# Patient Record
(demographics unavailable — no encounter records)

---

## 2018-03-22 NOTE — [place] [address]                    SURGICAL PATH RPT PROCEDURE   
_______________________________________________________________________________
 
Name:       МАРИЯ ESPINO               Room:                      REG CLI 
MJOSÉ MIGUEL#:  [id number]      Account #:      [id number]  
Admission:  03/13/18     Date of Birth:  12/19/62  
Discharge:                             Report #:    [id number]
                                                         Path Case #: [id number] 
_______________________________________________________________________________
 
  
PATHOLOGY REPORT 
    
COLLECTION DATE: 3/13/2018   RECEIVED DATE: 3/13/2018  
 SUBMITTING PHYS: Dr. Rafael Zuniga
OTHER PHYS:
GAEL Oconnor
   
SPECIMEN(S) RECEIVED:
A.R breast
    
* * * * * * * * * * * *
   
FINAL DIAGNOSIS:
Breast "breast biopsy":
- DUCTAL CARCINOMA IN SITU INTERMEDIATE GRADE MEASURING 0.3 CM IN
GREATEST DIMENSION WITH MICROCALCIFICATION. 
 
COMMENT:
This case is also reviewed by Dr. Beatriz Gunter.  
Dr. Rafael Zuniga nurse Ludy was notified on 03/14/2018.  Predictive
breast markers, ER and MT, will be ordered and an additional report
will follow.  
(SHA:pit; 3/14/2018) 
 
PATHOLOGIST:   Georges Stovall M.D. 
REPORT ELECTRONICALLY SIGNED BY:   Georges Stovall M.D.
DATE/TIME:   3/15/2018 09:29
    
* * * * * * * * * * * *
 
GROSS PATHOLOGY:
Received in formalin labeled "Мария Espino, right breast tissue,"
are multiple needle cores of yellow-gray fibrofatty tissue measuring
3.1 x 5.2 x 0.5 cm in aggregate dimensions.  Also received is a
plastic cassette containing multiple cores of yellow-gray fibrofatty
tissue measuring 2.3 x 1.5 x 0.5 cm in aggregate dimensions.  The
tissue in the cassette is transferred to cassette A3, and the
remaining tissue is submitted in its entirety in cassette A1 and A2. 
The cold ischemic time is 18 minutes.  The total formalin fixation
time is 12 hours and 18 minutes.
(TSD; 3/13/2018)
 
 
CLINICAL HISTORY:
Right stereotactic breast biopsy for right breast calcifications
 
INITIAL CPT CODE(S):
 
[place] [address]                    SURGICAL PATH RPT PROCEDURE   
_______________________________________________________________________________
 
Name:       МАРИЯ ESPINO               Room:                      REG CLI 
M.R.#:  [id number]      Account #:      [id number]  
Admission:  03/13/18     Date of Birth:  12/19/62  
Discharge:                             Report #:    [id number]
                                                         Path Case #: [id number] 
_______________________________________________________________________________
A; 80140, 99049(2)
Professional services performed [place] [facility] [place] [address]
 
  Technical services performed by [facility] at [address] [place] [address],
[address] [address].
 
PROCEDURE REPORT (Order Date: 3/20/2018 00:00)
    
COMMENT: 
Quantitative image analysis was performed on block A2.  Please see
next page for scanned image of results.
(AMJ 03/21/2018) 
PATHOLOGIST:  Georges Stovall M.D.
REPORT ELECTRONICALLY SIGNED BY:  Georges Stovall M.D.
DATE/TIME:  3/22/2018 15:29
 
   
[facility]
[address] [address]
PHONE:  [phone]
DIRECTOR:  Spencer W. Kerley, M.D.
* * *  END OF REPORT  * * *

## 2018-04-08 NOTE — ON[place] [address]                    RADIATION ONCOLOGY NOTE       
_______________________________________________________________________________
 
Name:       SRINIVASALEJANDRO A               Room:                      REG CLI 
M.R.#:  [id number]      Account #:      [id number]  
Admission:  18     Attend Phys:    Isaac Ruggiero MD    
Discharge:               Date of Birth:  62  
         Report #: [id number]
                                                                     [id number]  
_______________________________________________________________________________
THIS REPORT FOR:  //name//                      
 
CC: Isaac Pacheco MD
 
 
DATE OF SERVICE:  2018
 
[facility] Phone: [phone]
 
 
REFERRING PHYSICIANS:  Cj Graves DO; Nancy Vergara, nurse practitioner;
cardiologist is Dr. Yanes and Jessica Pacheco MD.
 
PRIMARY SITE AND HISTOPATHOLOGY:  The patient has findings consistent with a
stage 0 ([id number]) ductal carcinoma in situ of the right breast.  It is
intermediate grade.
 
HISTORY OF PRESENT ILLNESS:  The patient had a routine screening mammogram
performed at [facility] on 2018 that revealed a new small
cluster of indeterminate microcalcifications in the anterior right breast.  She
had a biopsy of that clustered microcalcifications and on 2018 that
revealed ductal carcinoma in situ, intermediate grade and measured about 0.3 cm
on the core biopsy.  She denied having any nipple discharge. She denied having
any palpable masses involving the right breast or the left breast. She saw her
surgeon, Dr. Cj Graves, and  her medical oncologist, Dr. Pacheco.  At this
point, she appears to be proceeding with breast conservation therapy.
 
PAST MEDICAL HISTORY AND PAST SURGICAL HISTORY:  She has hypertension.  She had
a  section in  at [facility].  She had a
 section in  at the [facility] and she also had
a right ovarian removal in  at [facility].
 
MEDICATIONS:  Include hydrochlorothiazide, lisinopril, Zyrtec and then, she has
supplements that she takes as well.
 
ALLERGIES:  TO SULFA, CODEINE, WHICH CAUSED HIVES AND RASH; BETADINE, WHICH
CAUSES RASH AND LATEX MAKES HER SKIN VERY DRY.
 
OBSTETRICS AND GYNECOLOGY:  Menarche age 13, menopause around 2016.  She is
 2, para 2, SAB 0.
 
FAMILY HISTORY:  Maternal grandmother had a breast cancer around age 55.  She is
 
 
 
[place] [address]                    RADIATION ONCOLOGY NOTE       
_______________________________________________________________________________
 
Name:       ALEJANDRO ESPINO MELL               Room:                      REG CLI 
M.RDario#:  [id number]      Account #:      [id number]  
Admission:  18     Attend Phys:    Isaac Ruggiero MD    
Discharge:               Date of Birth:  62  
         Report #: [id number]
                                                                     [id number]  
_______________________________________________________________________________
.  She has 2 daughters. Ethanol, she  drinks an alcohol
containing drink about 1 times a week.  Cigarettes: she does not smoke.
 
REVIEW OF SYSTEMS:
GENERAL:  She had some mild weight gain recently.
SKIN:  She denied having color changes except for a little bit of bruising where
she had her biopsy on the right breast.
LYMPH NODES:  She denied having enlarged or painful glands in the neck.
ENDOCRINE:  She denied any hot or cold intolerance.
HEMATOLOGY AND IMMUNOLOGY: She denied having any anemia or recent bleeding.
MUSCULOSKELETAL:  She denied having any arthritis or painful swollen joints.
HEAD AND NECK:  She denied having any headaches or migraines.
RESPIRATORY:  She denied having shortness of breath.
CARDIOVASCULAR:  She denied having palpitations.
GASTROINTESTINAL:  She denied having nausea.
NEUROLOGIC:  She denied having any focal weakness.
 
PHYSICAL EXAMINATION:  With my nurse, Sallie Guillaume present:
VITAL SIGNS:  Height 5 feet 2 inches, weight 165 pounds.  Blood pressure 146/87,
pulse 80, saturation 95% on room air, respirations 16.
LYMPH NODES:  She had no palpable cervical, supraclavicular or axillary
lymphadenopathy.
GENERAL AND PSYCHIATRIC:  She was alert, oriented, and in no acute distress.
EYES:  Pupils were equal, round, reactive
to light and accommodation.  Extraocular
movements were intact.
HEAD:  Mouth had no visible lesions.
HEART:  Had a regular rate and rhythm without murmur.
LUNGS: were clear to auscultation.
ABDOMEN:  Not tender.  Spleen was not palpable.  Liver was at the costal margin.
BREASTS:  Right breast had a small palpable seroma in the area of the biopsy
that measured about 1.5 cm x 1.5 cm at the 12 o'clock position.  There were no
suspicious palpable masses involving the right breast.  There were no suspicious
palpable masses involving the left breast.
EXTREMITIES:  Had no clubbing, cyanosis or edema.
NEUROLOGIC:  Cranial nerves II to XII were
intact.  Sensation was intact.  She had 5/5
strength in her extremities. 
 
ASSESSMENT AND PLAN:  The patient has findings consistent with an intermediate
grade ductal carcinoma in situ.  She was told that her treatment options
include breast conservation therapy versus mastectomy.  Radiation therapy can
further reduce the chance of recurrent cancer in the breast when pursuing
breast conservation therapy.  The efficacy of radiation therapy can be found in
the article entitled "Pathologic Findings From the National Surgical Adjuvant
Breast Project eight-year update of protocol B-17." That was published in .
 
 
 
[place] [address]                    RADIATION ONCOLOGY NOTE       
_______________________________________________________________________________
 
Name:       ALEJANDRO ESPINO               Room:                      REG CLI 
M.R.#:  [id number]      Account #:      [id number]  
Admission:  18     Attend Phys:    Isaac Ruggiero MD    
Discharge:               Date of Birth:  62  
         Report #: [id number]
                                                                     [id number]  
_______________________________________________________________________________
In that study, patients with ductal carcinoma in situ were randomized between
lumpectomy alone versus lumpectomy and radiation therapy.  At 8 years, the
frequency of ipsilateral breast tumor recurrence was reduced from 31% to 13%
with radiation therapy.  So the risks, benefits and logistics of radiation
therapy explained to the patient in detail and she wanted to go ahead and
proceed with radiation therapy to the right breast as part of breast
conservation therapy. She gave her witnessed, informed consent to proceed with
radiation therapy.
 
Thank you very much for this consult.
 
 
 
 
 
 
 
 
 
 
 
 
 
 
 
 
 
 
 
 
 
 
 
 
 
 
 
 
 
 
 
 
 
 
<ELECTRONICALLY SIGNED>
                                        By:  Isaac Ruggiero MD             
18     2155
D: 18 1210_______________________________________
T: 18 1432Dmary Ruggiero MD                /nt

## 2018-04-20 NOTE — S
[address]  [address]                    SURGICAL PATH RPT PROCEDURE   
_______________________________________________________________________________
 
Name:       SRINIVASМАРИЯ MELL               Room:                      [facility]#:  [id number]      Account #:      [id number]  
Admission:  04/18/18     Date of Birth:  12/19/62  
Discharge:                             Report #:    [id number]
                                                         Path Case #: [id number] 
_______________________________________________________________________________
 
  
PATHOLOGY REPORT 
    
COLLECTION DATE: 4/18/2018   RECEIVED DATE: 4/18/2018  
 SUBMITTING PHYS: Dr. Cj Graves
OTHER PHYS:
GAEL Oconnor
   
SPECIMEN(S) RECEIVED:
A.Right breast mass
    
* * * * * * * * * * * *
   
FINAL DIAGNOSIS:
Right breast mass, needle localizational lumpectomy:
- Benign breast tissue with fibrocystic changes and prior biopsy site
including metallic clip, with no residual ductal carcinoma in
situ/atypia.
 
 
     CLINICAL
Clinical History:                   Prior history of breast cancer
      Specify Site, Diagnosis, and Prior Treatment: see comment
Radiologic Finding:                 Calcifications
SPECIMEN
Procedure:                          Excision with wire-guided
localization
Lymph Node Sampling:                No lymph nodes present
Specimen Laterality:                Right
Tumor Site:                         Not specified
TUMOR
   Size (Extent) of DCIS
   Estimated Size (extent) of DCIS (greatest dimension using gross
and microscopic evaluation) is at Least (mm): 3
Histologic Type:                    Ductal carcinoma in situ. 
Classified as Tis (DCIS) or Tis (Paget)
Architectural Patterns:             Solid
Nuclear Grade:                      Grade II (intermediate)
Necrosis:                           Present, central (expansive
"comedo" necrosis)
MARGINS
Margins uninvolved by DCIS
 Distance of DCIS from Closest Margin (mm):        Cannot be
assessed: no residual DCIS in specimen
ACCESSORY FINDINGS
Treatment Effect: Response to Presurgical (Neoadjuvant) Therapy: 
     No known presurgical therapy
Microcalcifications:                Present in DCIS
 
[address] [place] [address]                    SURGICAL PATH RPT PROCEDURE   
_______________________________________________________________________________
 
Name:       МАРИЯ ESPINO               Room:                      [facility]#:  [id number]      Account #:      [id number]  
Admission:  04/18/18     Date of Birth:  12/19/62  
Discharge:                             Report #:    [id number]
                                                         Path Case #: [id number] 
_______________________________________________________________________________
     Present in nonneoplastic tissue
STAGE (PTNM)
Primary Tumor (pT):                 pTis (DCIS):  Ductal carcinoma in
situ
Category (pN):                       pNX:  Regional lymph nodes
cannot be assessed (e.g., previously removed, or not removed for
patholog
-----------------------------------------
 
 
COMMENT:
There is no residual DCIS in the specimen and the synoptic data is
updated to include the information from the prior right breast biopsy
([id number]) which showed DCIS, intermediate grade spanning 0.3 cm in
association with microcalcifications.  Breast tumor prolife studies
performed on A2 of that specimen showed the following:  
ER 93.5%
UT 11.1%
(ADELA:diane; 04/20/2018) 
 
PATHOLOGIST:   Yang Mckeon M.D. 
REPORT ELECTRONICALLY SIGNED BY:   Yang Mckeon M.D.
DATE/TIME:   4/20/2018 16:20
    
* * * * * * * * * * * *
 
GROSS PATHOLOGY:
The specimen is received in formalin labeled "Мария Espino, right
breast mass".  Received is a 16 g lumpectomy specimen which is
received inked as follows: Superior-red, inferior-blue,
lateral-orange, medial-yellow, anterior-green, posterior-black.  The
specimen measures 4.3 cm from anterior to posterior, 3.3 cm from
superior to inferior, and 3.1 cm from medial to lateral.  There is a
localization wire present which enters through the superior margin
and exits through the inferior margin.  Sectioning reveals a previous
biopsy site, with a metallic clip present, measuring 0.9 x 0.8 x 0.5
cm.  This site is 0.1 cm from the lateral margin, 0.7 cm from the
medial margin, 1.3 cm from the inferior margin, 1.1 cm from the
superior margin, 1.5 cm from the posterior margin, and 2.0 cm from
the anterior margin.  The remainder of the specimen displays
yellow-tan, lobulated to white-tan, fibrous cut surfaces, with the
fibrous tissue encompassing approximately 50% of the specimen.  The
specimen is submitted entirely from anterior to posterior aspects as
follows:
A1          most anterior margin, serially sectioned
A2-A20     entire mid-portion of the specimen, with sections in
cassettes A3 through A18 additionally bisected into superior and
inferior aspects
A21          most posterior margin, serially sectioned[place] [address]                    SURGICAL PATH RPT PROCEDURE   
_______________________________________________________________________________
 
Name:       МАРИЯ ESPINO               [id number]      Account #:      [id number]  
Admission:  04/18/18     Date of Birth:  12/19/62  
Discharge:                             Report #:    [id number]
                                                         Path Case #: [id number] 
_______________________________________________________________________________
The previous biopsy site is submitted in cassettes A11 through A16. 
The cold ischemic time and time in formalin are not provided.  The
time out of formalin is 9:50 PM on 4/19/2018.     
(CAA; 4/19/2018)
 
 
CLINICAL HISTORY:
Right breast DCIS
 
INITIAL CPT CODE(S):
A; 15813
Professional services performed by [facility] [place] [address]
 
  Technical services performed by [facility] at [address] [place] [address],
[facility] [address] [address].
 
 
   
[facility]
[address] [place] [address]
PHONE:  [phone]
DIRECTOR:  Spencer W. Kerley, M.D.
* * *  END OF REPORT  * * *

## 2018-05-09 NOTE — OP
[address]  [address]                    OPERATIVE REPORT              
_______________________________________________________________________________
 
Name:       SRINIVASALEJANDRO MONTE               Room:                      [facility]#:  [id number]      Account #:      [id number]  
Admission:  04/18/18     Attend Phys:    Cj rGaves DO   
Discharge:               Date of Birth:  12/19/62  
         Report #: [id number]
                                                                     [id number]  
_______________________________________________________________________________
THIS REPORT FOR:  //name//                      
 
CC: Cj Vergara RNC
    JESSICA BERG 
    Patient's Chart 
 
DATE OF SERVICE:  04/18/2018
 
 
REFERRING PHYSICIANS:
1.  JODI Oconnor
2.  Jessica Berg MD
3.  Isaac Ruggiero MD
 
PREOPERATIVE DIAGNOSIS:  Ductal carcinoma in situ of the right breast.
 
POSTOPERATIVE DIAGNOSIS:  Ductal carcinoma in situ of the right breast pending
final pathology.
 
PROCEDURE:  Right breast lumpectomy after wire localization.
 
SURGEON:  Cj Graves DO
 
FIRST ASSISTANT:  Joe Amor DO
 
SECOND ASSISTANT:  Student, Dr. Shoaib Phan, MS3.
 
ANESTHESIA:  General with an LMA.
 
ESTIMATED BLOOD LOSS:  20 mL.
 
COMPLICATIONS:  None.
 
SPECIMEN REMOVED:  Right breast lump.
 
DESCRIPTION OF PROCEDURE:  After obtaining proper consents and discussing risks
and complications with the patient, she was taken to the Radiology Department
where she underwent wire localization of a previously placed clip from a
previous biopsy that revealed ductal carcinoma in situ.  After that, she was
taken back to the preoperative holding area.  Films were reviewed and her right
breast was marked.  She was then taken to the operating room, laid on the supine
position, administered general anesthesia.  She was prepped and draped in the
usual fashion.  Timeout was performed.  We confirmed the appropriate patient and
 
 
 
[place] [address]                    OPERATIVE REPORT              
_______________________________________________________________________________
 
Name:       ALEJANDRO ESPINO               Room:                      [facility]#:  [id number]      Account #:      [id number]  
Admission:  04/18/18     Attend Phys:    Cj Graves DO   
Discharge:               Date of Birth:  12/19/62  
         Report #: [id number]
                                                                     [id number]  
_______________________________________________________________________________
procedure.  Approximately a 3 cm long skin incision was then made around the
guidewire.  This was carried down through the skin into the subcutaneous tissue
using electrocautery for hemostasis.  Sharp dissection with Metzenbaum scissors
was then used to remove an area of normal tissue around the wire.  Once this was
complete, the specimen was marked using the vector marking system.  We then
assured hemostasis within the wound using electrocautery.  The wound was then
closed using 2-0 Vicryl suture for the deeper subcutaneous and breast tissue.  A
3-0 Vicryl suture was used to close the subcutaneous tissue and then 4-0
Monocryl was used to close the skin.  The wound was injected with 0.5% Marcaine
without epinephrine.  Dermabond and a pressure dressing were placed.  The
patient tolerated the procedure well, was awakened in the operating room and
transported to recovery room in stable condition.
 
 
 
 
 
 
 
 
 
 
 
 
 
 
 
 
 
 
 
 
 
 
 
 
 
 
 
 
 
 
 
 
<ELECTRONICALLY SIGNED>
                                        By:  Cj Graves DO            
05/09/18     0948
D: 04/18/18 1056_______________________________________
T: 04/18/18 Erich Graves DO               /nt

## 2018-09-01 NOTE — ON[place] [address]                    RADIATION ONCOLOGY NOTE       
_______________________________________________________________________________
 
Name:       ALEJANDRO ESPINO               Room:                      REG CLI 
M.R.#:  [id number]      Account #:      [id number]  
Admission:  08/17/18     Attend Phys:    Isaac Ruggiero MD    
Discharge:               Date of Birth:  12/19/62  
         Report #: [id number]
                                                                     [id number]  
_______________________________________________________________________________
THIS REPORT FOR:  //name//                      
 
CC: Isaac Pacheco MD
 
DATE OF SERVICE:  08/17/2018
 
 
Radiation Oncology Followup Note
 
REFERRING PHYSICIANS:  Devendra Simmons MD; Cj Graves DO; Jessica Pcaheco MD
 
PRIMARY SITE AND HISTOPATHOLOGY:  The patient underwent radiation therapy as
part of breast conservation therapy for a ductal carcinoma in situ of the right
breast.  The radiation therapy was completed on 06/19/2018. 
 
[facility] phone number is [phone].
 
INTERVAL NOTE:  The patient felt like she has recuperated from her radiation
therapy.  She denied having any nipple discharge from the right breast.  She
denied having any nipple discharge from the left breast.  She denied having any
suspicious palpable masses involving the right breast.  She denied having any
suspicious palpable masses involving the left breast.
 
SOCIAL HISTORY:  Cigarettes:  The patient does not smoke cigarettes.
 
MEDICATIONS:  Include hydrochlorothiazide, lisinopril, Zyrtec.
 
REVIEW OF SYSTEMS:
RESPIRATORY:  Breathing was stable.  She was not short of breath.
MUSCULOSKELETAL:  She had good range of motion of her upper extremities.
 
PHYSICAL EXAMINATION:  With my nurse, Sallie Guillaume, present. VITAL SIGNS:  the
patient's weight was 164.6 pounds on 08/17/2018 and she was 169 pounds on
06/19/2018.  She was also 166 pounds and 9.6 ounces on 06/12/2018 and on
08/17/2018, blood pressure was 139/94, pulse 86, oxygen saturation 96%.  LYMPH
NODES:  She had no palpable cervical, supraclavicular or axillary
lymphadenopathy.  HEART:  Had a regular rate and rhythm without murmur.  LUNGS:
were clear to auscultation.  BREASTS:  Right breast had no suspicious palpable
masses.  Left breast had no suspicious palpable masses.  ABDOMEN:  Not tender. 
Spleen was not palpable.  Liver was at the costal margin.  EXTREMITIES:  Had no
clubbing, cyanosis or edema.  NEUROLOGIC:  Cranial nerves II to XII were
intact.  Sensation was intact.  She had 5/5 strength in her extremities.
 
 
 
[place] [address]                    RADIATION ONCOLOGY NOTE       
_______________________________________________________________________________
 
Name:       ALEJANDRO ESPINO               Room:                      REG CLI 
M.R.#:  [id number]      Account #:      [id number]  
Admission:  08/17/18     Attend Phys:    Isaac Ruggiero MD    
Discharge:               Date of Birth:  12/19/62  
         Report #: [id number]
                                                                     [id number]  
_______________________________________________________________________________
 
ASSESSMENT AND PLAN:
 
1.  History of ductal carcinoma in situ of the right breast-  She has no
evidence of breast cancer at this time.  She was given a requisition to have a
right mammogram at this time.  She has an appointment with her medical
oncologist, Dr. Pacheco, on 11/27/2018.  She was given a requisition for a
bilateral mammogram in 02/2019.  She was asked to schedule a followup
appointment to see me afterwards.
 
2.  Hypertension-  The patient takes hydrochlorothiazide and lisinopril that is
managed by her referring physicians.
 
3.  Environmental allergies- the patient takes Zyrtec and that is managed by
her referring physicians.
 
Thank you for allowing me to participate in the care of this patient.
 
 
 
 
 
 
 
 
 
 
 
 
 
 
 
 
 
 
 
 
 
 
 
 
 
 
 
<ELECTRONICALLY SIGNED>
                                        By:  Isaac Ruggiero MD             
09/01/18     0013
D: 08/17/18 1637_______________________________________
T: 08/18/18 [id number] Ruggiero MD                /nt